# Patient Record
Sex: MALE | Race: WHITE | Employment: FULL TIME | URBAN - METROPOLITAN AREA
[De-identification: names, ages, dates, MRNs, and addresses within clinical notes are randomized per-mention and may not be internally consistent; named-entity substitution may affect disease eponyms.]

---

## 2017-02-18 ENCOUNTER — OFFICE VISIT (OUTPATIENT)
Dept: URGENT CARE | Facility: URGENT CARE | Age: 32
End: 2017-02-18
Payer: COMMERCIAL

## 2017-02-18 VITALS
HEART RATE: 112 BPM | WEIGHT: 232 LBS | DIASTOLIC BLOOD PRESSURE: 86 MMHG | BODY MASS INDEX: 34.26 KG/M2 | OXYGEN SATURATION: 96 % | SYSTOLIC BLOOD PRESSURE: 124 MMHG | TEMPERATURE: 99 F

## 2017-02-18 DIAGNOSIS — R05.9 COUGH: ICD-10-CM

## 2017-02-18 DIAGNOSIS — R07.0 THROAT PAIN: ICD-10-CM

## 2017-02-18 DIAGNOSIS — K51.919 ULCERATIVE COLITIS WITH COMPLICATION, UNSPECIFIED LOCATION (H): Primary | ICD-10-CM

## 2017-02-18 LAB
DEPRECATED S PYO AG THROAT QL EIA: NORMAL
FLUAV+FLUBV AG SPEC QL: NORMAL
FLUAV+FLUBV AG SPEC QL: NORMAL
MICRO REPORT STATUS: NORMAL
SPECIMEN SOURCE: NORMAL
SPECIMEN SOURCE: NORMAL

## 2017-02-18 PROCEDURE — 87880 STREP A ASSAY W/OPTIC: CPT | Performed by: FAMILY MEDICINE

## 2017-02-18 PROCEDURE — 87804 INFLUENZA ASSAY W/OPTIC: CPT | Performed by: FAMILY MEDICINE

## 2017-02-18 PROCEDURE — 87081 CULTURE SCREEN ONLY: CPT | Performed by: FAMILY MEDICINE

## 2017-02-18 PROCEDURE — 99214 OFFICE O/P EST MOD 30 MIN: CPT | Performed by: FAMILY MEDICINE

## 2017-02-18 NOTE — PROGRESS NOTES
"SUBJECTIVE: Jose Valdovinos is a 31 year old male presenting with a chief complaint of   1. Colitis flare after superbowl, 3 weeks  2. URI for 1 day with cough.    Course of illness is worsening.    Severity moderate  Current and Associated symptoms: \"cold symptoms\"  Treatment measures tried include OTC meds.  Predisposing factors include UC and has had flares with good success tx with proctofoam.    Past Medical History   Diagnosis Date     Ulcerative colitis      No Known Allergies  Social History   Substance Use Topics     Smoking status: Never Smoker     Smokeless tobacco: Never Used     Alcohol use Yes      Comment: 1-2 x week       ROS:  SKIN: no rash  GI: no vomiting    OBJECTIVE:  /86 (BP Location: Left arm, Patient Position: Chair, Cuff Size: Adult Large)  Pulse 112  Temp 99  F (37.2  C) (Oral)  Wt 232 lb (105.2 kg)  SpO2 96%  BMI 34.26 kg/m2   GENERAL APPEARANCE: healthy, alert and no distress  EYES: EOMI,  PERRL, conjunctiva clear  HENT: ear canals and TM's normal.  Nose and mouth without ulcers, erythema or lesions  NECK: supple, nontender, no lymphadenopathy  RESP: lungs clear to auscultation - no rales, rhonchi or wheezes  CV: regular rates and rhythm, normal S1 S2, no murmur noted  ABDOMEN:  soft, nontender, no HSM or masses and bowel sounds normal  SKIN: no suspicious lesions or rashes      ICD-10-CM    1. Ulcerative colitis with complication, unspecified location (H) K51.919 hydrocortisone (PROCTOCORT) 10 % rectal foam   2. Cough R05 Influenza A/B antigen   3. Throat pain R07.0 Rapid strep screen     Beta strep group A culture       Fluids/Rest, f/u if worse/not any better    "

## 2017-02-18 NOTE — MR AVS SNAPSHOT
"              After Visit Summary   2/18/2017    Jose Valdovinos    MRN: [id number]           Patient Information     Date Of Birth          1985        Visit Information        Provider Department      2/18/2017 9:15 AM Levon Diaz DO [facility]        Today's Diagnoses     Ulcerative colitis with complication, unspecified location (H)    -  1    Cough        Throat pain           Follow-ups after your visit        Your next 10 appointments already scheduled     Feb 22, 2017 11:45 AM CST   Office Visit with Buzz Brock MD   [facility]    [address] [id number] [phone]           Bring a current list of meds and any records pertaining to this visit.  For Physicals, please bring immunization records and any forms needing to be filled out.  Please arrive 10 minutes early to complete paperwork.              Who to contact     If you have questions or need follow up information about today's clinic visit or your schedule please contact [facility] directly at [phone].  Normal or non-critical lab and imaging results will be communicated to you by Stocardhart, letter or phone within 4 business days after the clinic has received the results. If you do not hear from us within 7 days, please contact the clinic through Buccaneert or phone. If you have a critical or abnormal lab result, we will notify you by phone as soon as possible.  Submit refill requests through Zubka or call your pharmacy and they will forward the refill request to us. Please allow 3 business days for your refill to be completed.          Additional Information About Your Visit        MyChart Information     Zubka lets you send messages to your doctor, view your test results, renew your prescriptions, schedule appointments and more. To sign up, go to [email or web address] . Click on \"Log " "in\" on the left side of the screen, which will take you to the Welcome page. Then click on \"Sign up Now\" on the right side of the page.     You will be asked to enter the access code listed below, as well as some personal information. Please follow the directions to create your username and password.     Your access code is: [email or web address]  Expires: 2017 10:16 AM     Your access code will  in 90 days. If you need help or a new code, please call your [facility] clinic or [phone].        Care EveryWhere ID     This is your Care EveryWhere ID. This could be used by other organizations to access your [facility] medical records  [id number]        Your Vitals Were     Pulse Temperature Pulse Oximetry BMI (Body Mass Index)          112 99  F (37.2  C) (Oral) 96% 34.26 kg/m2         Blood Pressure from Last 3 Encounters:   17 124/86   13 136/82   12 142/80    Weight from Last 3 Encounters:   17 232 lb (105.2 kg)   13 200 lb (90.7 kg)   12 195 lb (88.5 kg)              We Performed the Following     Beta strep group A culture     Influenza A/B antigen     Rapid strep screen          Today's Medication Changes          These changes are accurate as of: 17 10:16 AM.  If you have any questions, ask your nurse or doctor.               These medicines have changed or have updated prescriptions.        Dose/Directions    * hydrocortisone 10 % rectal foam   Commonly known as:  PROCTOCORT   This may have changed:  Another medication with the same name was added. Make sure you understand how and when to take each.   Used for:  Ulcerative colitis, unspecified   Changed by:  Buzz Brock MD        Dose:  1 applicator   Place 1 applicator rectally 2 times daily.   Quantity:  15 g   Refills:  1       * hydrocortisone 10 % rectal foam   Commonly known as:  PROCTOCORT   This may have changed:  You were already taking a medication with the same name, and this prescription was " added. Make sure you understand how and when to take each.   Used for:  Ulcerative colitis with complication, unspecified location (H)   Changed by:  Levon Diaz,         Dose:  1 applicator   Place 1 applicator rectally 2 times daily   Quantity:  90 g   Refills:  0       * Notice:  This list has 2 medication(s) that are the same as other medications prescribed for you. Read the directions carefully, and ask your doctor or other care provider to review them with you.         Where to get your medicines      These medications were sent to [place] [address] [place] [facility] [address] [place] [address]  [address] [place] [address]     Phone:  [phone]     hydrocortisone 10 % rectal foam                Primary Care Provider Office Phone # Fax #    Buzz Brock [phone]       [facility] [address] [place] [address]        Thank you!     Thank you for choosing [facility]  for your care. Our goal is always to provide you with excellent care. Hearing back from our patients is one way we can continue to improve our services. Please take a few minutes to complete the written survey that you may receive in the mail after your visit with us. Thank you!             Your Updated Medication List - Protect others around you: Learn how to safely use, store and throw away your medicines at www.disposemymeds.org.          This list is accurate as of: 2/18/17 10:16 AM.  Always use your most recent med list.                   Brand Name Dispense Instructions for use    balsalazide 750 MG capsule    COLAZAL    270 capsule    Take 3 capsules (2,250 mg) by mouth 3 times daily       * hydrocortisone 10 % rectal foam    PROCTOCORT    15 g    Place 1 applicator rectally 2 times daily.       * hydrocortisone 10 % rectal foam    PROCTOCORT    90 g    Place 1 applicator rectally 2 times daily       * Notice:  This list has 2 medication(s) that are  the same as other medications prescribed for you. Read the directions carefully, and ask your doctor or other care provider to review them with you.

## 2017-02-18 NOTE — NURSING NOTE
"Chief Complaint   Patient presents with     Colitis     colitis flare x two weeks.      Cough     cough, running stuffy nose, throat pain and feel feverish since Tuesday.        Initial /86 (BP Location: Left arm, Patient Position: Chair, Cuff Size: Adult Large)  Pulse 112  Temp 99  F (37.2  C) (Oral)  Wt 232 lb (105.2 kg)  SpO2 96%  BMI 34.26 kg/m2 Estimated body mass index is 34.26 kg/(m^2) as calculated from the following:    Height as of 3/18/13: 5' 9\" (1.753 m).    Weight as of this encounter: 232 lb (105.2 kg).  Medication Reconciliation: complete    "

## 2017-02-20 LAB
BACTERIA SPEC CULT: NORMAL
MICRO REPORT STATUS: NORMAL
SPECIMEN SOURCE: NORMAL

## 2017-02-22 ENCOUNTER — OFFICE VISIT (OUTPATIENT)
Dept: INTERNAL MEDICINE | Facility: CLINIC | Age: 32
End: 2017-02-22
Payer: COMMERCIAL

## 2017-02-22 VITALS
HEART RATE: 108 BPM | TEMPERATURE: 98.3 F | DIASTOLIC BLOOD PRESSURE: 78 MMHG | OXYGEN SATURATION: 96 % | HEIGHT: 68 IN | WEIGHT: 232.3 LBS | BODY MASS INDEX: 35.21 KG/M2 | RESPIRATION RATE: 16 BRPM | SYSTOLIC BLOOD PRESSURE: 128 MMHG

## 2017-02-22 DIAGNOSIS — K51.919 ULCERATIVE COLITIS WITH COMPLICATION, UNSPECIFIED LOCATION (H): Primary | ICD-10-CM

## 2017-02-22 PROCEDURE — 99214 OFFICE O/P EST MOD 30 MIN: CPT | Performed by: INTERNAL MEDICINE

## 2017-02-22 RX ORDER — PREDNISONE 20 MG/1
TABLET ORAL
Qty: 15 TABLET | Refills: 0 | Status: SHIPPED | OUTPATIENT
Start: 2017-02-22

## 2017-02-22 NOTE — MR AVS SNAPSHOT
After Visit Summary   2/22/2017    Jose Valdovinos    MRN: [id number]           Patient Information     Date Of Birth          1985        Visit Information        Provider Department      2/22/2017 11:45 AM Buzz Brock MD [facility]        Today's Diagnoses     Ulcerative colitis with complication, unspecified location (H)    -  1       Follow-ups after your visit        Additional Services     GASTROENTEROLOGY ADULT REF CONSULT ONLY       Preferred Location: [facility] [phone]      Please be aware that coverage of these services is subject to the terms and limitations of your health insurance plan.  Call member services at your health plan with any benefit or coverage questions.  Any procedures must be performed at a [facility] facility OR coordinated by your clinic's referral office.    Please bring the following with you to your appointment:    (1) Any X-Rays, CTs or MRIs which have been performed.  Contact the facility where they were done to arrange for  prior to your scheduled appointment.    (2) List of current medications   (3) This referral request   (4) Any documents/labs given to you for this referral                  Who to contact     If you have questions or need follow up information about today's clinic visit or your schedule please contact [facility] directly at [phone].  Normal or non-critical lab and imaging results will be communicated to you by MyChart, letter or phone within 4 business days after the clinic has received the results. If you do not hear from us within 7 days, please contact the clinic through MyChart or phone. If you have a critical or abnormal lab result, we will notify you by phone as soon as possible.  Submit refill requests through AutoGnomics or call your pharmacy and they will forward the refill request to us. Please allow 3 business days for your refill to be completed.          Additional  "Information About Your Visit        MyChart Information     Buyoo lets you send messages to your doctor, view your test results, renew your prescriptions, schedule appointments and more. To sign up, go to [email or web address] . Click on \"Log in\" on the left side of the screen, which will take you to the Welcome page. Then click on \"Sign up Now\" on the right side of the page.     You will be asked to enter the access code listed below, as well as some personal information. Please follow the directions to create your username and password.     Your access code is: [email or web address]  Expires: 2017 10:16 AM     Your access code will  in 90 days. If you need help or a new code, please call your [facility] clinic or [phone].        Care EveryWhere ID     This is your Care EveryWhere ID. This could be used by other organizations to access your [facility] medical records  [id number]        Your Vitals Were     Pulse Temperature Respirations Height Pulse Oximetry BMI (Body Mass Index)    108 98.3  F (36.8  C) (Oral) 16 5' 8\" (1.727 m) 96% 35.32 kg/m2       Blood Pressure from Last 3 Encounters:   17 128/78   17 124/86   13 136/82    Weight from Last 3 Encounters:   17 232 lb 4.8 oz (105.4 kg)   17 232 lb (105.2 kg)   13 200 lb (90.7 kg)              We Performed the Following     GASTROENTEROLOGY ADULT REF CONSULT ONLY          Today's Medication Changes          These changes are accurate as of: 17 12:00 PM.  If you have any questions, ask your nurse or doctor.               Start taking these medicines.        Dose/Directions    predniSONE 20 MG tablet   Commonly known as:  DELTASONE   Used for:  Ulcerative colitis with complication, unspecified location (H)   Started by:  Buzz Brock MD        Two tabs daily x 5 days, then 1 tab daily x 5 days, then stop   Quantity:  15 tablet   Refills:  0            Where to get your medicines      These medications were " sent to [facility] [id number] [place] [facility] - [place], [place] - [address]  [address], [facility] [address]     Phone:  [phone]     predniSONE 20 MG tablet                Primary Care Provider Office Phone # Fax #    Buzz Vikram Brock [phone]       [facility] [address] [facility] [address]        Thank you!     Thank you for choosing [facility]  for your care. Our goal is always to provide you with excellent care. Hearing back from our patients is one way we can continue to improve our services. Please take a few minutes to complete the written survey that you may receive in the mail after your visit with us. Thank you!             Your Updated Medication List - Protect others around you: Learn how to safely use, store and throw away your medicines at www.disposemymeds.org.          This list is accurate as of: 2/22/17 12:00 PM.  Always use your most recent med list.                   Brand Name Dispense Instructions for use    balsalazide 750 MG capsule    COLAZAL    270 capsule    Take 3 capsules (2,250 mg) by mouth 3 times daily       * hydrocortisone 10 % rectal foam    PROCTOCORT    15 g    Place 1 applicator rectally 2 times daily.       * hydrocortisone 10 % rectal foam    PROCTOCORT    90 g    Place 1 applicator rectally 2 times daily       predniSONE 20 MG tablet    DELTASONE    15 tablet    Two tabs daily x 5 days, then 1 tab daily x 5 days, then stop       * Notice:  This list has 2 medication(s) that are the same as other medications prescribed for you. Read the directions carefully, and ask your doctor or other care provider to review them with you.

## 2017-02-22 NOTE — NURSING NOTE
"Chief Complaint   Patient presents with     Colitis       Initial /78 (BP Location: Left arm, Patient Position: Chair, Cuff Size: Adult Large)  Pulse 108  Temp 98.3  F (36.8  C) (Oral)  Resp 16  Ht 5' 8\" (1.727 m)  Wt 232 lb 4.8 oz (105.4 kg)  SpO2 96%  BMI 35.32 kg/m2 Estimated body mass index is 35.32 kg/(m^2) as calculated from the following:    Height as of this encounter: 5' 8\" (1.727 m).    Weight as of this encounter: 232 lb 4.8 oz (105.4 kg).  Medication Reconciliation: complete   States his colitis is bad again  Anastasia Larson LPN      "

## 2017-02-22 NOTE — PROGRESS NOTES
"  SUBJECTIVE:                                                    Jose Valdovinos is a 31 year old male who presents to clinic today for the following health issues:      Colitis  Diarrhea.abd pain      Amount of exercise or physical activity: 2-3 days/week for an average of 15-30 minutes    Problems taking medications regularly: No    Medication side effects: none  Diet: regular (no restrictions)        Problem list and histories reviewed & adjusted, as indicated.  Additional history:         ROS:  Constitutional, HEENT, cardiovascular, pulmonary, gi and gu systems are negative, except as otherwise noted.    OBJECTIVE:                                                    /78 (BP Location: Left arm, Patient Position: Chair, Cuff Size: Adult Large)  Pulse 108  Temp 98.3  F (36.8  C) (Oral)  Resp 16  Ht 5' 8\" (1.727 m)  Wt 232 lb 4.8 oz (105.4 kg)  SpO2 96%  BMI 35.32 kg/m2  Body mass index is 35.32 kg/(m^2).  GENERAL: healthy, alert and no distress  NECK: no adenopathy, no asymmetry, masses, or scars and thyroid normal to palpation  RESP: lungs clear to auscultation - no rales, rhonchi or wheezes  CV: regular rate and rhythm, normal S1 S2, no S3 or S4, no murmur, click or rub, no peripheral edema and peripheral pulses strong  ABDOMEN: soft, nontender, no hepatosplenomegaly, no masses and bowel sounds normal  MS: no gross musculoskeletal defects noted, no edema    Diagnostic Test Results:  none      ASSESSMENT/PLAN:                                                      1. Ulcerative colitis with complication, unspecified location (H)    - predniSONE (DELTASONE) 20 MG tablet; Two tabs daily x 5 days, then 1 tab daily x 5 days, then stop  Dispense: 15 tablet; Refill: 0  - GASTROENTEROLOGY ADULT REF CONSULT ONLY        Buzz Brock MD  [facility]    "

## 2017-03-15 ENCOUNTER — TRANSFERRED RECORDS (OUTPATIENT)
Dept: HEALTH INFORMATION MANAGEMENT | Facility: CLINIC | Age: 32
End: 2017-03-15

## 2017-04-11 ENCOUNTER — TRANSFERRED RECORDS (OUTPATIENT)
Dept: HEALTH INFORMATION MANAGEMENT | Facility: CLINIC | Age: 32
End: 2017-04-11

## 2017-04-20 ENCOUNTER — TRANSFERRED RECORDS (OUTPATIENT)
Dept: HEALTH INFORMATION MANAGEMENT | Facility: CLINIC | Age: 32
End: 2017-04-20

## 2017-05-08 ENCOUNTER — TRANSFERRED RECORDS (OUTPATIENT)
Dept: HEALTH INFORMATION MANAGEMENT | Facility: CLINIC | Age: 32
End: 2017-05-08

## 2017-05-10 DIAGNOSIS — K51.919 ULCERATIVE COLITIS WITH COMPLICATION, UNSPECIFIED LOCATION (H): ICD-10-CM

## 2017-05-10 NOTE — TELEPHONE ENCOUNTER
balsalazide (COLAZAL) 750 MG capsule      Last Written Prescription Date:  4/08/2016  Last Fill Quantity: 270,   # refills: 11  Last Office Visit with [facility], [facility] or  Health prescribing provider: 2/22/2017  Future Office visit:       Routing refill request to provider for review/approval because:  Drug not on the [facility], [facility] or [facility] refill protocol or controlled substance

## 2017-05-11 RX ORDER — BALSALAZIDE DISODIUM 750 MG/1
CAPSULE ORAL
Qty: 270 CAPSULE | Refills: 10 | Status: SHIPPED | OUTPATIENT
Start: 2017-05-11 | End: 2018-04-29

## 2017-06-28 ENCOUNTER — TRANSFERRED RECORDS (OUTPATIENT)
Dept: HEALTH INFORMATION MANAGEMENT | Facility: CLINIC | Age: 32
End: 2017-06-28

## 2018-04-29 DIAGNOSIS — K51.919 ULCERATIVE COLITIS WITH COMPLICATION, UNSPECIFIED LOCATION (H): ICD-10-CM

## 2018-04-29 NOTE — TELEPHONE ENCOUNTER
balsalazide (COLAZAL) 750 MG capsule      Last Written Prescription Date:  5/11/17  Last Fill Quantity: 270 CAPSULE,   # refills: 10  Last Office Visit: 2/22/17 WITH WESTLEY  Future Office visit:       Routing refill request to provider for review/approval because:  Drug not on the [facility], [facility] or [facility] refill protocol or controlled substance

## 2018-04-30 RX ORDER — BALSALAZIDE DISODIUM 750 MG/1
CAPSULE ORAL
Qty: 270 CAPSULE | Refills: 2 | Status: SHIPPED | OUTPATIENT
Start: 2018-04-30 | End: 2018-08-04

## 2018-08-04 DIAGNOSIS — K51.919 ULCERATIVE COLITIS WITH COMPLICATION, UNSPECIFIED LOCATION (H): ICD-10-CM

## 2018-08-04 NOTE — TELEPHONE ENCOUNTER
Requested Prescriptions   Pending Prescriptions Disp Refills     balsalazide (COLAZAL) 750 MG capsule [Pharmacy Med Name: BALSALAZIDE DISODIUM 750 MG CP] 270 capsule 2     Sig: TAKE THREE CAPSULES BY MOUTH THREE TIMES DAILY    There is no refill protocol information for this order        Last Written Prescription Date:  4/30/2018  Last Fill Quantity: 270,  # refills: 2   Last office visit: 2/22/2017 with prescribing provider:  2/22/2017   Future Office Visit:

## 2018-08-07 RX ORDER — BALSALAZIDE DISODIUM 750 MG/1
CAPSULE ORAL
Qty: 270 CAPSULE | Refills: 2 | Status: SHIPPED | OUTPATIENT
Start: 2018-08-07 | End: 2018-11-03

## 2018-11-03 DIAGNOSIS — K51.919 ULCERATIVE COLITIS WITH COMPLICATION, UNSPECIFIED LOCATION (H): ICD-10-CM

## 2018-11-03 NOTE — TELEPHONE ENCOUNTER
"Requested Prescriptions   Pending Prescriptions Disp Refills     balsalazide (COLAZAL) 750 MG capsule [Pharmacy Med Name: BALSALAZIDE DISODIUM 750 MG CP] 270 capsule 2    Last Written Prescription Date:  8/7/2018  Last Fill Quantity: 270,  # refills: 2   Last office visit: 2/22/2017 with prescribing provider:  2/22/2017   Future Office Visit:     Sig: TAKE THREE CAPSULES BY MOUTH THREE TIMES DAILY    Inflammatory Bowel Agents Failed    11/3/2018  1:11 AM       Failed - Serum Creatinine on file in past 12 months    Recent Labs   Lab Test  03/18/13   0841   CR  0.89            Failed - Recent (12 mo) or future (30 days) visit within the authorizing provider's specialty    Patient had office visit in the last 12 months or has a visit in the next 30 days with authorizing provider or within the authorizing provider's specialty.  See \"Patient Info\" tab in inbasket, or \"Choose Columns\" in Meds & Orders section of the refill encounter.             Passed - Medication is active on med list       Passed - Patient is age 18 or older          "

## 2018-11-06 RX ORDER — BALSALAZIDE DISODIUM 750 MG/1
CAPSULE ORAL
Qty: 270 CAPSULE | Refills: 2 | Status: SHIPPED | OUTPATIENT
Start: 2018-11-06 | End: 2019-02-17

## 2018-11-06 NOTE — TELEPHONE ENCOUNTER
Routing refill request to provider for review/approval because:  Labs not current:  Creatinine   Patient needs to be seen because it has been more than 1 year since last office visit.

## 2019-02-17 DIAGNOSIS — K51.919 ULCERATIVE COLITIS WITH COMPLICATION, UNSPECIFIED LOCATION (H): ICD-10-CM

## 2019-02-18 NOTE — TELEPHONE ENCOUNTER
"Requested Prescriptions   Pending Prescriptions Disp Refills     balsalazide (COLAZAL) 750 MG capsule [Pharmacy Med Name: BALSALAZIDE DISODIUM 750 MG CP]  Last Written Prescription Date:  11/06/2018  Last Fill Quantity: 270,  # refills: 2   Last office visit: 2/22/2017 with prescribing provider:  WESTELY   Future Office Visit:     270 capsule 2     Sig: TAKE THREE CAPSULES BY MOUTH THREE TIMES DAILY    Inflammatory Bowel Agents Failed - 2/17/2019 11:38 PM       Failed - Serum Creatinine on file in past 12 months    Recent Labs   Lab Test 03/18/13  0841   CR 0.89            Failed - Recent (12 mo) or future (30 days) visit within the authorizing provider's specialty    Patient had office visit in the last 12 months or has a visit in the next 30 days with authorizing provider or within the authorizing provider's specialty.  See \"Patient Info\" tab in inbasket, or \"Choose Columns\" in Meds & Orders section of the refill encounter.             Passed - Medication is active on med list       Passed - Patient is age 18 or older          "

## 2019-02-20 RX ORDER — BALSALAZIDE DISODIUM 750 MG/1
CAPSULE ORAL
Qty: 270 CAPSULE | Refills: 2 | Status: SHIPPED | OUTPATIENT
Start: 2019-02-20 | End: 2019-05-28

## 2019-05-28 DIAGNOSIS — K51.919 ULCERATIVE COLITIS WITH COMPLICATION, UNSPECIFIED LOCATION (H): ICD-10-CM

## 2019-05-29 RX ORDER — BALSALAZIDE DISODIUM 750 MG/1
CAPSULE ORAL
Qty: 270 CAPSULE | Refills: 2 | Status: SHIPPED | OUTPATIENT
Start: 2019-05-29 | End: 2019-08-31

## 2019-08-31 DIAGNOSIS — K51.919 ULCERATIVE COLITIS WITH COMPLICATION, UNSPECIFIED LOCATION (H): ICD-10-CM

## 2019-08-31 NOTE — TELEPHONE ENCOUNTER
"Requested Prescriptions   Pending Prescriptions Disp Refills     balsalazide (COLAZAL) 750 MG capsule [Pharmacy Med Name: BALSALAZIDE DISODIUM 750 MG CP] 270 capsule 2     Sig: TAKE THREE CAPSULES BY MOUTH THREE TIMES DAILY   Last Written Prescription Date:  5/29/2019  Last Fill Quantity: 270,  # refills: 2   Last Office Visit: 2/22/2017   Future Office Visit:         Inflammatory Bowel Agents Failed - 8/31/2019 12:34 PM        Failed - Serum Creatinine on file in past 12 months     Recent Labs   Lab Test 03/18/13  0841   CR 0.89             Failed - Recent (12 mo) or future (30 days) visit within the authorizing provider's specialty     Patient had office visit in the last 12 months or has a visit in the next 30 days with authorizing provider or within the authorizing provider's specialty.  See \"Patient Info\" tab in inbasket, or \"Choose Columns\" in Meds & Orders section of the refill encounter.              Passed - Medication is active on med list        Passed - Patient is age 18 or older          "

## 2019-09-03 RX ORDER — BALSALAZIDE DISODIUM 750 MG/1
CAPSULE ORAL
Qty: 270 CAPSULE | Refills: 2 | Status: SHIPPED | OUTPATIENT
Start: 2019-09-03 | End: 2020-01-03

## 2019-09-30 DIAGNOSIS — K51.919 ULCERATIVE COLITIS WITH COMPLICATION, UNSPECIFIED LOCATION (H): ICD-10-CM

## 2019-09-30 RX ORDER — BALSALAZIDE DISODIUM 750 MG/1
CAPSULE ORAL
Qty: 270 CAPSULE | Refills: 2 | OUTPATIENT
Start: 2019-09-30

## 2019-09-30 NOTE — TELEPHONE ENCOUNTER
"Requested Prescriptions   Pending Prescriptions Disp Refills     balsalazide (COLAZAL) 750 MG capsule [Pharmacy Med Name: BALSALAZIDE DISODIUM 750 MG CP] 270 capsule 2     Sig: TAKE THREE CAPSULES BY MOUTH THREE TIMES DAILY       Inflammatory Bowel Agents Failed - 9/30/2019  7:32 AM        Failed - Serum Creatinine on file in past 12 months     Recent Labs   Lab Test 03/18/13  0841   CR 0.89             Failed - Recent (12 mo) or future (30 days) visit within the authorizing provider's specialty     Patient has had an office visit with the authorizing provider or a provider within the authorizing providers department within the previous 12 mos or has a future within next 30 days. See \"Patient Info\" tab in inbasket, or \"Choose Columns\" in Meds & Orders section of the refill encounter.              Passed - Medication is active on med list        Passed - Patient is age 18 or older        Last Written Prescription Date:  09/03/2019  Last Fill Quantity: 270,  # refills: 2   Last office visit: 2/22/2017 with prescribing provider:  Dr Brock   Future Office Visit:      "

## 2020-01-03 DIAGNOSIS — K51.919 ULCERATIVE COLITIS WITH COMPLICATION, UNSPECIFIED LOCATION (H): ICD-10-CM

## 2020-01-03 RX ORDER — BALSALAZIDE DISODIUM 750 MG/1
CAPSULE ORAL
Qty: 270 CAPSULE | Refills: 0 | Status: SHIPPED | OUTPATIENT
Start: 2020-01-03 | End: 2020-01-28

## 2020-01-03 NOTE — TELEPHONE ENCOUNTER
Reason for Call:  Other call back    Detailed comments: Patient has been getting refills on Balsalazide since he moved to [place] about 3 years ago. He received a text message stating it would not be refilled until he saw Dr. Brock. He is wondering if he can get a 30 day refill until he can get in to see a specialist in [place]. Please call him back to discuss.     Phone Number Patient can be reached at: Cell number on file:    Telephone Information:   Mobile [phone]       Best Time: anytime    Can we leave a detailed message on this number? YES    Call taken on 1/3/2020 at 11:13 AM by Shweta Guzmán

## 2020-01-25 DIAGNOSIS — K51.919 ULCERATIVE COLITIS WITH COMPLICATION, UNSPECIFIED LOCATION (H): ICD-10-CM

## 2020-01-25 NOTE — TELEPHONE ENCOUNTER
"Requested Prescriptions   Pending Prescriptions Disp Refills     balsalazide (COLAZAL) 750 MG capsule [Pharmacy Med Name: BALSALAZIDE DISODIUM 750 MG CP] 270 capsule 2     Sig: TAKE THREE CAPSULES BY MOUTH THREE TIMES DAILY   Last Written Prescription Date:  1/3/2020  Last Fill Quantity: 270,  # refills: 0   Last Office Visit: 2/22/2017   Future Office Visit:         Inflammatory Bowel Agents Failed - 1/25/2020  7:31 AM        Failed - Serum Creatinine on file in past 12 months     Recent Labs   Lab Test 03/18/13  0841   CR 0.89             Failed - Recent (12 mo) or future (30 days) visit within the authorizing provider's specialty     Patient has had an office visit with the authorizing provider or a provider within the authorizing providers department within the previous 12 mos or has a future within next 30 days. See \"Patient Info\" tab in inbasket, or \"Choose Columns\" in Meds & Orders section of the refill encounter.              Passed - Medication is active on med list        Passed - Patient is age 18 or older          "

## 2020-01-28 RX ORDER — BALSALAZIDE DISODIUM 750 MG/1
CAPSULE ORAL
Qty: 270 CAPSULE | Refills: 2 | Status: SHIPPED | OUTPATIENT
Start: 2020-01-28